# Patient Record
(demographics unavailable — no encounter records)

---

## 2024-11-25 NOTE — ASSESSMENT
[FreeTextEntry1] : DM continue Ozempic needs better diet compliance sees an endocrinologist at [facility]  male to female transgender continue estradiol  anxiety and depression paroxetine  chronic pain continue pregabalin  f/u with orthopedics  hyperlipidemia continue rosuvastatin

## 2024-11-25 NOTE — HISTORY OF PRESENT ILLNESS
[FreeTextEntry1] : cpe and establish care [de-identified] : c/o chronic pain in her back and has been to ortho and will be going to a chiropractor. Has DDD in her lower back. Tries to be active.  Has had knee surgeries, hernia repairs.  Has chronic migraines. She is not working. Was in  2007 - 2015.  Has an albuterol inhaler from [facility]. Told to use it.  She is diabetic and ate a lot of Halloween candy.  Sees a therapist [facility].  Transgender male to female. Has had surgeries including vaginalplasty and breast augmentation with implants.

## 2024-11-25 NOTE — HEALTH RISK ASSESSMENT
[No] : In the past 12 months have you used drugs other than those required for medical reasons? No [Little interest or pleasure doing things] : 1) Little interest or pleasure doing things [Feeling down, depressed, or hopeless] : 2) Feeling down, depressed, or hopeless [0] : 2) Feeling down, depressed, or hopeless: Not at all (0) [PHQ-2 Negative - No further assessment needed] : PHQ-2 Negative - No further assessment needed [[id number]] : 0 [Never] : Never